# Patient Record
Sex: MALE | Race: OTHER
[De-identification: names, ages, dates, MRNs, and addresses within clinical notes are randomized per-mention and may not be internally consistent; named-entity substitution may affect disease eponyms.]

---

## 2017-12-04 ENCOUNTER — HOSPITAL ENCOUNTER (INPATIENT)
Dept: HOSPITAL 74 - YASAS | Age: 52
LOS: 4 days | Discharge: HOME | End: 2017-12-08
Attending: INTERNAL MEDICINE | Admitting: INTERNAL MEDICINE
Payer: COMMERCIAL

## 2017-12-04 VITALS — BODY MASS INDEX: 37.2 KG/M2

## 2017-12-04 DIAGNOSIS — G47.00: ICD-10-CM

## 2017-12-04 DIAGNOSIS — Z98.84: ICD-10-CM

## 2017-12-04 DIAGNOSIS — F10.24: ICD-10-CM

## 2017-12-04 DIAGNOSIS — F10.230: Primary | ICD-10-CM

## 2017-12-04 DIAGNOSIS — F34.1: ICD-10-CM

## 2017-12-04 LAB
APPEARANCE UR: CLEAR
BILIRUB UR STRIP.AUTO-MCNC: NEGATIVE MG/DL
COLOR UR: (no result)
KETONES UR QL STRIP: NEGATIVE
NITRITE UR QL STRIP: NEGATIVE
PH UR: 5 [PH] (ref 5–8)
PROT UR QL STRIP: (no result)
PROT UR QL STRIP: NEGATIVE
RBC # UR STRIP: NEGATIVE /UL
SP GR UR: 1 (ref 1–1.03)
UROBILINOGEN UR STRIP-MCNC: NEGATIVE MG/DL (ref 0.2–1)

## 2017-12-04 PROCEDURE — HZ2ZZZZ DETOXIFICATION SERVICES FOR SUBSTANCE ABUSE TREATMENT: ICD-10-PCS | Performed by: INTERNAL MEDICINE

## 2017-12-04 RX ADMIN — Medication SCH MG: at 22:07

## 2017-12-04 NOTE — HP
CIWA Score





- CIWA Score


Nausea/Vomitin-Mild Nausea/No Vomiting


Muscle Tremors: 4-Moderate,w/Arms Extend


Anxiety: 4-Mod. Anxious/Guarded


Agitation: 4-Moderately Restless


Paroxysmal Sweats: 1-Minimal Palms Moist


Orientation: 1-Uncertain about Date


Tacttile Disturbances: 0-None


Auditory Disturbances: 0-None


Visual Disturbances: 0-None


Headache: 0-None Present


CIWA-Ar Total Score: 15





Admission ROS [facility]





- HPI


Chief Complaint: 





WITHDRAWAL SX


Allergies/Adverse Reactions: 


 Allergies











Allergy/AdvReac Type Severity Reaction Status Date / Time


 


No Known Allergies Allergy   Verified 17 17:36











History of Present Illness: 





52 YEARS OLD MALE WITH LONG HISTORY OF ALCOHOL DEPENDENCE GASTRIC BY [facility]  

HAS DEPRESSION IS ADMITTED TO DETOX


Exam Limitations: No Limitations





- Ebola screening


Have you traveled outside of the country in the last 21 days: No


Have you had contact with anyone from an Ebola affected area: No


Have you been sick,other than usual withdrawal symptoms: No


Do you have a fever: No





- Review of Systems


Constitutional: Chills, Weight Stable


EENT: reports: No Symptoms Reported


Respiratory: reports: No Symptoms reported


Cardiac: reports: No Symptoms Reported


GI: reports: Nausea, Poor Fluid Intake, Abdominal cramping


: reports: No Symptoms Reported


Musculoskeletal: reports: No Symptoms Reported


Integumentary: reports: Bruising, Change in Color (FOREHEAD FELL  TREATED 

AT [facility] NEGATIVE CT HEAD)


Neuro: reports: Tremors


Endocrine: reports: No Symptoms Reported


Hematology: reports: No Symptoms Reported


Psychiatric: reports: Judgement Intact, Anxious, Depressed


Other Systems: Reviewed and Negative





Patient History





- Patient Medical History


Hx Anemia: No


Hx Asthma: No


Hx Chronic Obstructive Pulmonary Disease (COPD): No


Hx Cancer: No


Hx Cardiac Disorders: No


Hx Congestive Heart Failure: No


Hx Hypertension: No


Hx Hypercholesterolemia: No


Hx Pacemaker: No


HX Cerebrovascular Accident: No


Hx Seizures: No


Hx Dementia: No


Hx Diabetes: No


Hx Gastrointestinal Disorders: No


Hx Liver Disease: No


Hx Genitourinary Disorders: No


Hx Sexually Transmitted Disorders: No


Hx Renal Disease (ESRD): No


Hx Thyroid Disease: No


Hx Human Immunodeficiency Virus (HIV): No


Hx Hepatitis C: No


Hx Depression: Yes


Hx Suicide Attempt: No


Hx Bipolar Disorder: No


Hx Schizophrenia: No





- Patient Surgical History


Past Surgical History: Yes


Hx Abdominal Surgery: Yes ()


Anesthesia Reaction: No





- PPD History


Previous Implant?: Yes


Documented Results: Negative w/o proof


Implanted On Prior [facility] Admission?: No


PPD to be Administered?: Yes





- Smoking Cessation


Smoking history: Former smoker


Have you smoked in the past 12 months: No


Aproximately how many cigarettes per day: 0


Hx Chewing Tobacco Use: No


Initiated information on smoking cessation: No





- Substance & Tx. History


Hx Alcohol Use: Yes


Hx Substance Use: No


Substance Use Type: Alcohol


Hx Substance Use Treatment: No





- Substances Abused


  ** Alcohol


Route: Oral


Frequency: Daily


Amount used: 20 BEERS X40 OZ


Age of first use: 18


Date of Last Use: 17





Family Disease History





- Family Disease History


Family Disease History: Diabetes: Mother, [place]: Father (), Other: Father





Admission Physical Exam [facility]





- Vital Signs


Vital Signs: 


 Vital Signs - 24 hr











  17





  17:03


 


Temperature 98.0 F


 


Pulse Rate 102 H


 


Respiratory 18





Rate 


 


Blood Pressure 141/90














- Physical


General Appearance: Yes: Appropriately Dressed, Moderate Distress, Alcohol on 

Breath, Obese, Tremorous, Irritable, Sweating, Anxious


HEENTM: Yes: Hearing grossly Normal, Normal ENT Inspection, Normocephalic, 

Normal Voice


Respiratory: Yes: Chest Non-Tender, Lungs Clear, Normal Breath Sounds, No 

Respiratory Distress, No Accessory Muscle Use


Neck: Yes: Supple, Trachea in good position


Breast: Yes: Breasts Symetrical


Cardiology: Yes: Regular Rhythm, S1, S2, Tachycardia


Abdominal: Yes: Normal Bowel Sounds, Non Tender, Soft


Genitourinary: Yes: Within Normal Limits


Back: Yes: Normal Inspection


Musculoskeletal: Yes: full range of Motion, Gait Steady


Extremities: Yes: Normal Inspection, Normal Range of Motion, Non-Tender, Tremors


Neurological: Yes: Alert, Motor Strength 5/5, Normal Response, Depressed Affect


Integumentary: Yes: Warm


Lymphatic: Yes: Within Normal Limits





- Diagnostic


(1) Alcohol dependence with uncomplicated withdrawal


Current Visit: Yes   Status: Acute   





(2) Gastric bypass status for obesity


Current Visit: Yes   Status: Resolved   Comment:    





(3) Depression (emotion)


Current Visit: Yes   Status: Suspected   


Qualifiers: 


   Depression Type: dysthymia   Qualified Code(s): F34.1 - Dysthymic disorder   





Cleared for Admission [facility]





- Detox or Rehab


[facility] Level of Care: Medically Managed


Detox Regimen/Protocol: Librium





[facility] Breath Alcohol Content


Breath Alcohol Content: 0.249





Urine Drug Screen





- Results


Drug Screen Negative: Yes

## 2017-12-05 LAB
ALBUMIN SERPL-MCNC: 3.3 G/DL (ref 3.4–5)
ALP SERPL-CCNC: 98 U/L (ref 45–117)
ALT SERPL-CCNC: 37 U/L (ref 12–78)
ANION GAP SERPL CALC-SCNC: 10 MMOL/L (ref 8–16)
AST SERPL-CCNC: 39 U/L (ref 15–37)
BILIRUB SERPL-MCNC: 0.9 MG/DL (ref 0.2–1)
CALCIUM SERPL-MCNC: 7.8 MG/DL (ref 8.5–10.1)
CO2 SERPL-SCNC: 25 MMOL/L (ref 21–32)
CREAT SERPL-MCNC: 0.8 MG/DL (ref 0.7–1.3)
DEPRECATED RDW RBC AUTO: 15 % (ref 11.9–15.9)
GLUCOSE SERPL-MCNC: 179 MG/DL (ref 74–106)
MCH RBC QN AUTO: 25 PG (ref 25.7–33.7)
MCHC RBC AUTO-ENTMCNC: 31.8 G/DL (ref 32–35.9)
MCV RBC: 78.6 FL (ref 80–96)
PLATELET # BLD AUTO: 207 K/MM3 (ref 134–434)
PMV BLD: 9.1 FL (ref 7.5–11.1)
PROT SERPL-MCNC: 6.9 G/DL (ref 6.4–8.2)
WBC # BLD AUTO: 6.1 K/MM3 (ref 4–10)

## 2017-12-05 RX ADMIN — ZOLPIDEM TARTRATE PRN MG: 10 TABLET, FILM COATED ORAL at 22:01

## 2017-12-05 RX ADMIN — BACITRACIN ZINC SCH GM: 500 OINTMENT TOPICAL at 22:00

## 2017-12-05 RX ADMIN — CYCLOBENZAPRINE HYDROCHLORIDE PRN MG: 10 TABLET, FILM COATED ORAL at 12:36

## 2017-12-05 RX ADMIN — Medication SCH TAB: at 10:02

## 2017-12-05 RX ADMIN — BACITRACIN ZINC SCH GM: 500 OINTMENT TOPICAL at 12:33

## 2017-12-05 RX ADMIN — Medication SCH MG: at 22:00

## 2017-12-05 NOTE — CONSULT
[facility] Psychiatric Consult





- Data


Date of interview: 12/05/17


Admission source: [facility]


Identifying data: First admission to [facility] for this 53 y/o  male 

seeking detox treatment on [address] for alcohol dependence.Patient is single,a 

father of one,domiciled and employed.


Substance Abuse History: Patient admits to active use of alcohol daily as 

detailed in the current [facility] report.See details.  Smoking history: Former 

smoker.  Have you smoked in the past 12 months: No.  Aproximately how many 

cigarettes per day: 0.  Hx Chewing Tobacco Use: No.  Initiated information on 

smoking cessation: No.  - Substance & Tx. History.  Hx Alcohol Use: Yes.  Hx 

Substance Use: No.  Substance Use Type: Alcohol.  Hx Substance Use Treatment: 

No.  - Substances Abused.  ** Alcohol.  Route: Oral.  Frequency: Daily.  Amount 

used: 20 BEERS X40 OZ.  Age of first use: 18.  Date of Last Use: 12/04/17


Medical History: Diabetes mellitus and a history of gastric bypass.


Psychiatric History: Patient denies.


Physical/Sexual Abuse/Trauma History: Patient denies.


Additional Comment: Drug Screen is negative.





Mental Status Exam





- Mental Status Exam


Alert and Oriented to: Time, Place, Person


Cognitive Function: Good


Patient Appearance: Well Groomed


Mood: Hopeful, Euthymic


Affect: Appropriate, Normal Range


Patient Behavior: Fatigued, Cooperative


Speech Pattern: Clear


Voice Loudness: Normal


Thought Process: Intact, Goal Oriented


Thought Disorder: Not Present


Hallucinations: Denies


Suicidal Ideation: Denies


Homicidal Ideation: Denies


Insight/Judgement: Poor


Sleep: Poorly, Difficulty falling asleep


Appetite: Good


Muscle strength/Tone: Normal


Gait/Station: Normal





Psychiatric Findings





- Problem List (Axis 1, 2,3)


(1) Alcohol dependence with uncomplicated withdrawal


Current Visit: Yes   Status: Acute   





(2) Alcohol-induced mood disorder


Current Visit: Yes   Status: Suspected   





(3) Insomnia


Current Visit: Yes   Status: Acute   





- Initial Treatment Plan


Initial Treatment Plan: Psychoeducation.Sleep hygiene.Detoxification in 

progress.Ambien 10 mg po hs prn.Side effects/benefits discussed with the 

patient.Observation.

## 2017-12-05 NOTE — PN
[facility] CIWA





- CIWA Score


Nausea/Vomitin-No Nausea/No Vomiting


Muscle Tremors: 4-Moderate,w/Arms Extend


Anxiety: 5


Agitation: 4-Moderately Restless


Paroxysmal Sweats: 3


Orientation: 0-Oriented


Tacttile Disturbances: 3-Moderate Itch/Numb/Burn


Auditory Disturbances: 0-None


Visual Disturbances: 0-None


Headache: 0-None Present


CIWA-Ar Total Score: 19





[facility] Progress Note (SOAP)


Subjective: 





Tremors, Sweating, Body aches, Anxious, Interrupted Sleep.


Objective: 


PT. A & O X 3, OBSERVED AMBULATING ON UNIT. NO ACUTE DISTRESS.





17 13:05


 Vital Signs











Temperature  96.6 F L  17 09:42


 


Pulse Rate  101 H  17 09:42


 


Respiratory Rate  20   17 09:42


 


Blood Pressure  145/84   17 09:42


 


O2 Sat by Pulse Oximetry (%)      








 Laboratory Tests











  17





  23:00 07:00 07:00


 


WBC   6.1 


 


RBC   5.39 


 


Hgb   13.5 


 


Hct   42.4 


 


MCV   78.6 L 


 


MCH   25.0 L 


 


MCHC   31.8 L 


 


RDW   15.0 


 


Plt Count   207 


 


MPV   9.1 


 


Sodium    139


 


Potassium    4.4


 


Chloride    104


 


Carbon Dioxide    25


 


Anion Gap    10


 


BUN    13


 


Creatinine    0.8


 


Creat Clearance w eGFR    > 60


 


Random Glucose    179 H


 


Calcium    7.8 L


 


Total Bilirubin    0.9


 


AST    39 H


 


ALT    37


 


Alkaline Phosphatase    98


 


Total Protein    6.9


 


Albumin    3.3 L


 


Urine Color  Straw  


 


Urine Appearance  Clear  


 


Urine pH  5.0  


 


Ur Specific Gravity  1.005  


 


Urine Protein  Negative  


 


Urine Glucose (UA)  3+ H  


 


Urine Ketones  Negative  


 


Urine Blood  Negative  


 


Urine Nitrite  Negative  


 


Urine Bilirubin  Negative  


 


Urine Urobilinogen  Negative  


 


Ur Leukocyte Esterase  Negative  


 


RPR Titer   














  17





  07:00


 


WBC 


 


RBC 


 


Hgb 


 


Hct 


 


MCV 


 


MCH 


 


MCHC 


 


RDW 


 


Plt Count 


 


MPV 


 


Sodium 


 


Potassium 


 


Chloride 


 


Carbon Dioxide 


 


Anion Gap 


 


BUN 


 


Creatinine 


 


Creat Clearance w eGFR 


 


Random Glucose 


 


Calcium 


 


Total Bilirubin 


 


AST 


 


ALT 


 


Alkaline Phosphatase 


 


Total Protein 


 


Albumin 


 


Urine Color 


 


Urine Appearance 


 


Urine pH 


 


Ur Specific Gravity 


 


Urine Protein 


 


Urine Glucose (UA) 


 


Urine Ketones 


 


Urine Blood 


 


Urine Nitrite 


 


Urine Bilirubin 


 


Urine Urobilinogen 


 


Ur Leukocyte Esterase 


 


RPR Titer  Nonreactive








LABS NOTED.


Assessment: 





17 13:07


WITHDRAWAL SYMPTOMS.


Plan: 





CONTINUE DETOX.


CLONIDINE, 01. MG PO X 1 FOR DETOX SYMPTOMS.


FLEXERIL PRN FOR BODY ACHES/ MUSCLE SPASMS.


INCREASE DAILY PO FLUID INTAKE.

## 2017-12-06 LAB
APPEARANCE UR: CLEAR
BILIRUB UR STRIP.AUTO-MCNC: NEGATIVE MG/DL
COLOR UR: (no result)
KETONES UR QL STRIP: NEGATIVE
LEUKOCYTE ESTERASE UR QL STRIP.AUTO: NEGATIVE
NITRITE UR QL STRIP: NEGATIVE
PH UR: 6 [PH] (ref 5–8)
PROT UR QL STRIP: (no result)
PROT UR QL STRIP: NEGATIVE
RBC # UR STRIP: NEGATIVE /UL
SP GR UR: 1.01 (ref 1–1.03)
UROBILINOGEN UR STRIP-MCNC: NEGATIVE MG/DL (ref 0.2–1)

## 2017-12-06 RX ADMIN — BACITRACIN ZINC SCH GM: 500 OINTMENT TOPICAL at 10:07

## 2017-12-06 RX ADMIN — Medication SCH MG: at 22:05

## 2017-12-06 RX ADMIN — CYCLOBENZAPRINE HYDROCHLORIDE PRN MG: 10 TABLET, FILM COATED ORAL at 19:45

## 2017-12-06 RX ADMIN — Medication SCH TAB: at 10:07

## 2017-12-06 RX ADMIN — BACITRACIN ZINC SCH GM: 500 OINTMENT TOPICAL at 22:05

## 2017-12-06 RX ADMIN — ZOLPIDEM TARTRATE PRN MG: 10 TABLET, FILM COATED ORAL at 22:06

## 2017-12-06 RX ADMIN — CYCLOBENZAPRINE HYDROCHLORIDE PRN MG: 10 TABLET, FILM COATED ORAL at 07:15

## 2017-12-06 NOTE — EKG
Test Reason : 

Blood Pressure : ***/*** mmHG

Vent. Rate : 098 BPM     Atrial Rate : 098 BPM

   P-R Int : 178 ms          QRS Dur : 100 ms

    QT Int : 378 ms       P-R-T Axes : 040 010 059 degrees

   QTc Int : 482 ms

 

NORMAL SINUS RHYTHM

POSSIBLE INFERIOR INFARCT (CITED ON OR BEFORE 04-DEC-2017)

ABNORMAL ECG

WHEN COMPARED WITH ECG OF 04-DEC-2017 21:43,

NO SIGNIFICANT CHANGE WAS FOUND

Confirmed by MD Kelsey Daniel ([id number]) on 12/5/2017 4:47:30 PM

Also confirmed by MD Kelesy Daniel ([id number]),  GREYSON RODRIGEZ

([id number])  on 12/6/2017 8:27:13 AM

 

Referred By: Juan C EDWARDS           Confirmed By:Greyson Kelsey MD

## 2017-12-06 NOTE — EKG
Test Reason : 

Blood Pressure : ***/*** mmHG

Vent. Rate : 097 BPM     Atrial Rate : 097 BPM

   P-R Int : 198 ms          QRS Dur : 108 ms

    QT Int : 376 ms       P-R-T Axes : 041 014 050 degrees

   QTc Int : 477 ms

 

NORMAL SINUS RHYTHM

INFERIOR INFARCT , AGE UNDETERMINED

ABNORMAL ECG

NO PREVIOUS ECGS AVAILABLE

Confirmed by MD Kelsey Daniel ([id number]) on 12/5/2017 3:03:17 PM

Also confirmed by MD Kelsey Daniel [id number]),  GREYSON RODRIGEZ

([id number])  on 12/6/2017 7:59:06 AM

 

Referred By: Juan C EDWARDS           Confirmed By:Greyson Kelsey MD

## 2017-12-06 NOTE — PN
[facility] CIWA





- CIWA Score


Nausea/Vomitin-No Nausea/No Vomiting


Muscle Tremors: 4-Moderate,w/Arms Extend


Anxiety: 5


Agitation: 4-Moderately Restless


Paroxysmal Sweats: No Perspiration


Orientation: 2-Disoriented Date<2 days


Tacttile Disturbances: 2-Mild Itch/Numbness/Burn


Auditory Disturbances: 0-None


Visual Disturbances: 0-None


Headache: 0-None Present


CIWA-Ar Total Score: 17





[facility] Progress Note (SOAP)


Subjective: 





Tremors, Stomach Cramping, Body Aches, Anxious, Interrupted Sleep.


Objective: 


PT. A & O X 2 (UNCERTAIN ABOUT DAY/ DATE). PT. OBSERVED AMBULATING ON UNIT. NO 

ACUTE DISTRESS.





17 11:54


 Vital Signs











Temperature  96.7 F L  17 08:55


 


Pulse Rate  91 H  17 08:55


 


Respiratory Rate  20   17 08:55


 


Blood Pressure  152/78   17 08:55


 


O2 Sat by Pulse Oximetry (%)      








 Laboratory Tests











  17





  23:00 07:00 07:00


 


WBC   6.1 


 


RBC   5.39 


 


Hgb   13.5 


 


Hct   42.4 


 


MCV   78.6 L 


 


MCH   25.0 L 


 


MCHC   31.8 L 


 


RDW   15.0 


 


Plt Count   207 


 


MPV   9.1 


 


Sodium    139


 


Potassium    4.4


 


Chloride    104


 


Carbon Dioxide    25


 


Anion Gap    10


 


BUN    13


 


Creatinine    0.8


 


Creat Clearance w eGFR    > 60


 


Random Glucose    179 H


 


Calcium    7.8 L


 


Total Bilirubin    0.9


 


AST    39 H


 


ALT    37


 


Alkaline Phosphatase    98


 


Total Protein    6.9


 


Albumin    3.3 L


 


Urine Color  Straw  


 


Urine Appearance  Clear  


 


Urine pH  5.0  


 


Ur Specific Gravity  1.005  


 


Urine Protein  Negative  


 


Urine Glucose (UA)  3+ H  


 


Urine Ketones  Negative  


 


Urine Blood  Negative  


 


Urine Nitrite  Negative  


 


Urine Bilirubin  Negative  


 


Urine Urobilinogen  Negative  


 


Ur Leukocyte Esterase  Negative  


 


RPR Titer   














  17





  07:00


 


WBC 


 


RBC 


 


Hgb 


 


Hct 


 


MCV 


 


MCH 


 


MCHC 


 


RDW 


 


Plt Count 


 


MPV 


 


Sodium 


 


Potassium 


 


Chloride 


 


Carbon Dioxide 


 


Anion Gap 


 


BUN 


 


Creatinine 


 


Creat Clearance w eGFR 


 


Random Glucose 


 


Calcium 


 


Total Bilirubin 


 


AST 


 


ALT 


 


Alkaline Phosphatase 


 


Total Protein 


 


Albumin 


 


Urine Color 


 


Urine Appearance 


 


Urine pH 


 


Ur Specific Gravity 


 


Urine Protein 


 


Urine Glucose (UA) 


 


Urine Ketones 


 


Urine Blood 


 


Urine Nitrite 


 


Urine Bilirubin 


 


Urine Urobilinogen 


 


Ur Leukocyte Esterase 


 


RPR Titer  Nonreactive








LABS NOTED.


Assessment: 





17 11:54


WITHDRAWAL SYMPTOMS.


Plan: 





CONTINUE DETOX.


BGM ACBK, HGB A1C AND REPEAT UA FOR ELEVATED ADMISSION RANDOM GLUCOSE AND URINE 

GLUCOSE LEVELS.

## 2017-12-07 VITALS — TEMPERATURE: 97 F

## 2017-12-07 RX ADMIN — ZOLPIDEM TARTRATE PRN MG: 10 TABLET, FILM COATED ORAL at 22:06

## 2017-12-07 RX ADMIN — Medication SCH MG: at 22:06

## 2017-12-07 RX ADMIN — CYCLOBENZAPRINE HYDROCHLORIDE PRN MG: 10 TABLET, FILM COATED ORAL at 19:32

## 2017-12-07 RX ADMIN — CYCLOBENZAPRINE HYDROCHLORIDE PRN MG: 10 TABLET, FILM COATED ORAL at 05:11

## 2017-12-07 RX ADMIN — BACITRACIN ZINC SCH GM: 500 OINTMENT TOPICAL at 22:06

## 2017-12-07 RX ADMIN — BACITRACIN ZINC SCH GM: 500 OINTMENT TOPICAL at 10:07

## 2017-12-07 RX ADMIN — Medication SCH TAB: at 10:07

## 2017-12-07 NOTE — PN
[facility] Progress Note (SOAP)


Subjective: 





Fatigue, Anxious.


Objective: 


PT. A & O  X 3, OBSERVED AMBULATING ON UNIT. NO ACUTE DISTRESS.





12/07/17 15:03


 Vital Signs











Temperature  97.1 F L  12/07/17 12:58


 


Pulse Rate  95 H  12/07/17 12:58


 


Respiratory Rate  20   12/07/17 12:58


 


Blood Pressure  138/78   12/07/17 12:58


 


O2 Sat by Pulse Oximetry (%)      








 Laboratory Tests











  12/04/17 12/05/17 12/05/17





  23:00 07:00 07:00


 


WBC   6.1 


 


RBC   5.39 


 


Hgb   13.5 


 


Hct   42.4 


 


MCV   78.6 L 


 


MCH   25.0 L 


 


MCHC   31.8 L 


 


RDW   15.0 


 


Plt Count   207 


 


MPV   9.1 


 


Sodium    139


 


Potassium    4.4


 


Chloride    104


 


Carbon Dioxide    25


 


Anion Gap    10


 


BUN    13


 


Creatinine    0.8


 


Creat Clearance w eGFR    > 60


 


POC Glucometer   


 


Random Glucose    179 H


 


Hemoglobin A1c %   


 


Calcium    7.8 L


 


Total Bilirubin    0.9


 


AST    39 H


 


ALT    37


 


Alkaline Phosphatase    98


 


Total Protein    6.9


 


Albumin    3.3 L


 


Urine Color  Straw  


 


Urine Appearance  Clear  


 


Urine pH  5.0  


 


Ur Specific Gravity  1.005  


 


Urine Protein  Negative  


 


Urine Glucose (UA)  3+ H  


 


Urine Ketones  Negative  


 


Urine Blood  Negative  


 


Urine Nitrite  Negative  


 


Urine Bilirubin  Negative  


 


Urine Urobilinogen  Negative  


 


Ur Leukocyte Esterase  Negative  


 


RPR Titer   














  12/05/17 12/06/17 12/07/17





  07:00 21:45 05:12


 


WBC   


 


RBC   


 


Hgb   


 


Hct   


 


MCV   


 


MCH   


 


MCHC   


 


RDW   


 


Plt Count   


 


MPV   


 


Sodium   


 


Potassium   


 


Chloride   


 


Carbon Dioxide   


 


Anion Gap   


 


BUN   


 


Creatinine   


 


Creat Clearance w eGFR   


 


POC Glucometer    166


 


Random Glucose   


 


Hemoglobin A1c %   


 


Calcium   


 


Total Bilirubin   


 


AST   


 


ALT   


 


Alkaline Phosphatase   


 


Total Protein   


 


Albumin   


 


Urine Color   Straw 


 


Urine Appearance   Clear 


 


Urine pH   6.0 


 


Ur Specific Gravity   1.014 


 


Urine Protein   Negative 


 


Urine Glucose (UA)   3+ H 


 


Urine Ketones   Negative 


 


Urine Blood   Negative 


 


Urine Nitrite   Negative 


 


Urine Bilirubin   Negative 


 


Urine Urobilinogen   Negative 


 


Ur Leukocyte Esterase   Negative 


 


RPR Titer  Nonreactive  














  12/07/17





  07:00


 


WBC 


 


RBC 


 


Hgb 


 


Hct 


 


MCV 


 


MCH 


 


MCHC 


 


RDW 


 


Plt Count 


 


MPV 


 


Sodium 


 


Potassium 


 


Chloride 


 


Carbon Dioxide 


 


Anion Gap 


 


BUN 


 


Creatinine 


 


Creat Clearance w eGFR 


 


POC Glucometer 


 


Random Glucose 


 


Hemoglobin A1c %  8.4 H


 


Calcium 


 


Total Bilirubin 


 


AST 


 


ALT 


 


Alkaline Phosphatase 


 


Total Protein 


 


Albumin 


 


Urine Color 


 


Urine Appearance 


 


Urine pH 


 


Ur Specific Gravity 


 


Urine Protein 


 


Urine Glucose (UA) 


 


Urine Ketones 


 


Urine Blood 


 


Urine Nitrite 


 


Urine Bilirubin 


 


Urine Urobilinogen 


 


Ur Leukocyte Esterase 


 


RPR Titer 








LABS NOTED.


RESULTS OF BGM ACBK, HGB A1C, AND REPEAT UA NOTED.


12/07/17 15:04





Assessment: 





12/07/17 15:03


WITHDRAWAL SYMPTOMS.


Plan: 





CONTINUE DETOX.


INCREASE DAILY PO FLUID INTAKE.

## 2017-12-08 VITALS — HEART RATE: 83 BPM | SYSTOLIC BLOOD PRESSURE: 145 MMHG | DIASTOLIC BLOOD PRESSURE: 83 MMHG

## 2017-12-08 RX ADMIN — CYCLOBENZAPRINE HYDROCHLORIDE PRN MG: 10 TABLET, FILM COATED ORAL at 05:36

## 2017-12-08 NOTE — DS
[facility] Detox Discharge Summary


Admission Date: 


12/04/17





Discharge Date: 12/08/17





- History


Present History: Alcohol Dependence


Additional Comments: 





PATIENT ELECTING TO GO HOME AND DECLINES REFERRAL FOR AFTERCARE. PATIENT 

ADVISED TO CONSIDER LOCAL 12-STEP / AA OUTPATIENT SUPPORT GROUP MEETINGS FOR 

AFTERCARE. PATIENT WAS DISCHARGED FROM DETOX UNIT IN STABLE MEDICAL CONDITION.


Pertinent Past History: 





Insomnia, Depression, History of Gastric Bypass Surgery.





- Physical Exam Results


Vital Signs: 


 Vital Signs











Temperature  97 F L  12/08/17 06:17


 


Pulse Rate  83   12/08/17 06:17


 


Respiratory Rate  20   12/08/17 06:17


 


Blood Pressure  145/83   12/08/17 06:17


 


O2 Sat by Pulse Oximetry (%)      











Pertinent Admission Physical Exam Findings: 





WITHDRAWAL SYMPTOMS.





 Laboratory Tests











  12/04/17 12/05/17 12/05/17





  23:00 07:00 07:00


 


WBC   6.1 


 


RBC   5.39 


 


Hgb   13.5 


 


Hct   42.4 


 


MCV   78.6 L 


 


MCH   25.0 L 


 


MCHC   31.8 L 


 


RDW   15.0 


 


Plt Count   207 


 


MPV   9.1 


 


Sodium    139


 


Potassium    4.4


 


Chloride    104


 


Carbon Dioxide    25


 


Anion Gap    10


 


BUN    13


 


Creatinine    0.8


 


Creat Clearance w eGFR    > 60


 


POC Glucometer   


 


Random Glucose    179 H


 


Hemoglobin A1c %   


 


Calcium    7.8 L


 


Total Bilirubin    0.9


 


AST    39 H


 


ALT    37


 


Alkaline Phosphatase    98


 


Total Protein    6.9


 


Albumin    3.3 L


 


Urine Color  Straw  


 


Urine Appearance  Clear  


 


Urine pH  5.0  


 


Ur Specific Gravity  1.005  


 


Urine Protein  Negative  


 


Urine Glucose (UA)  3+ H  


 


Urine Ketones  Negative  


 


Urine Blood  Negative  


 


Urine Nitrite  Negative  


 


Urine Bilirubin  Negative  


 


Urine Urobilinogen  Negative  


 


Ur Leukocyte Esterase  Negative  


 


RPR Titer   














  12/05/17 12/06/17 12/07/17





  07:00 21:45 05:12


 


WBC   


 


RBC   


 


Hgb   


 


Hct   


 


MCV   


 


MCH   


 


MCHC   


 


RDW   


 


Plt Count   


 


MPV   


 


Sodium   


 


Potassium   


 


Chloride   


 


Carbon Dioxide   


 


Anion Gap   


 


BUN   


 


Creatinine   


 


Creat Clearance w eGFR   


 


POC Glucometer    166


 


Random Glucose   


 


Hemoglobin A1c %   


 


Calcium   


 


Total Bilirubin   


 


AST   


 


ALT   


 


Alkaline Phosphatase   


 


Total Protein   


 


Albumin   


 


Urine Color   Straw 


 


Urine Appearance   Clear 


 


Urine pH   6.0 


 


Ur Specific Gravity   1.014 


 


Urine Protein   Negative 


 


Urine Glucose (UA)   3+ H 


 


Urine Ketones   Negative 


 


Urine Blood   Negative 


 


Urine Nitrite   Negative 


 


Urine Bilirubin   Negative 


 


Urine Urobilinogen   Negative 


 


Ur Leukocyte Esterase   Negative 


 


RPR Titer  Nonreactive  














  12/07/17





  07:00


 


WBC 


 


RBC 


 


Hgb 


 


Hct 


 


MCV 


 


MCH 


 


MCHC 


 


RDW 


 


Plt Count 


 


MPV 


 


Sodium 


 


Potassium 


 


Chloride 


 


Carbon Dioxide 


 


Anion Gap 


 


BUN 


 


Creatinine 


 


Creat Clearance w eGFR 


 


POC Glucometer 


 


Random Glucose 


 


Hemoglobin A1c %  8.4 H


 


Calcium 


 


Total Bilirubin 


 


AST 


 


ALT 


 


Alkaline Phosphatase 


 


Total Protein 


 


Albumin 


 


Urine Color 


 


Urine Appearance 


 


Urine pH 


 


Ur Specific Gravity 


 


Urine Protein 


 


Urine Glucose (UA) 


 


Urine Ketones 


 


Urine Blood 


 


Urine Nitrite 


 


Urine Bilirubin 


 


Urine Urobilinogen 


 


Ur Leukocyte Esterase 


 


RPR Titer 








LABS NOTED.





- Treatment


Hospital Course: Detox Protocol Followed, Detoxed Safely, Responded well, 

Discharged Condition Good


Patient has Accepted a Rehab Referral to: PT. DECLINES. PT ADVISED TO CONSIDER 

LOCAL 12-STEP/AA SUPPORT GROUPS.





- Medication


Discharge Medications: 


Ambulatory Orders





NK [No Known Home Medication]  12/04/17 











- Diagnosis


(1) Alcohol dependence with uncomplicated withdrawal


Status: Acute   





(2) Insomnia


Status: Acute   


Qualifiers: 


   Insomnia type: unspecified   Qualified Code(s): G47.00 - Insomnia, 

unspecified   





(3) Alcohol-induced mood disorder


Status: Suspected   





(4) Gastric bypass status for obesity


Status: Resolved   





- AMA


Did Patient Leave Against Medical Advice: No

## 2018-06-26 PROBLEM — Z00.00 ENCOUNTER FOR PREVENTIVE HEALTH EXAMINATION: Status: ACTIVE | Noted: 2018-06-26

## 2018-07-12 ENCOUNTER — APPOINTMENT (OUTPATIENT)
Dept: PLASTIC SURGERY | Facility: CLINIC | Age: 53
End: 2018-07-12
Payer: MEDICAID

## 2018-07-12 PROCEDURE — 99024 POSTOP FOLLOW-UP VISIT: CPT

## 2018-10-11 ENCOUNTER — APPOINTMENT (OUTPATIENT)
Dept: PLASTIC SURGERY | Facility: CLINIC | Age: 53
End: 2018-10-11
Payer: MEDICAID

## 2018-10-11 VITALS — HEIGHT: 71 IN | BODY MASS INDEX: 30.1 KG/M2 | WEIGHT: 215 LBS

## 2018-10-11 PROCEDURE — 99213 OFFICE O/P EST LOW 20 MIN: CPT

## 2019-01-09 ENCOUNTER — APPOINTMENT (OUTPATIENT)
Dept: PLASTIC SURGERY | Facility: CLINIC | Age: 54
End: 2019-01-09
Payer: MEDICAID

## 2019-01-09 VITALS — WEIGHT: 215 LBS | BODY MASS INDEX: 30.1 KG/M2 | HEIGHT: 71 IN

## 2019-01-09 PROCEDURE — 99213 OFFICE O/P EST LOW 20 MIN: CPT

## 2019-01-10 ENCOUNTER — APPOINTMENT (OUTPATIENT)
Dept: PLASTIC SURGERY | Facility: CLINIC | Age: 54
End: 2019-01-10

## 2019-02-01 NOTE — HISTORY OF PRESENT ILLNESS
[FreeTextEntry1] : Patietn known to me from previous consultation. Now achieved goal weight which has been stable for 6 months. \par Good candidate for panniculectomy.\par Will coordinate surgery for near future.

## 2019-02-05 ENCOUNTER — HOSPITAL ENCOUNTER (EMERGENCY)
Dept: HOSPITAL 74 - JER | Age: 54
Discharge: LEFT BEFORE DISCHARGE | End: 2019-02-05
Payer: COMMERCIAL

## 2019-02-05 VITALS — BODY MASS INDEX: 39.5 KG/M2

## 2019-02-05 VITALS — HEART RATE: 96 BPM | DIASTOLIC BLOOD PRESSURE: 47 MMHG | TEMPERATURE: 98.3 F | SYSTOLIC BLOOD PRESSURE: 106 MMHG

## 2019-02-05 DIAGNOSIS — F10.120: Primary | ICD-10-CM

## 2019-02-05 NOTE — PDOC
History of Present Illness





- General


Chief Complaint: Alcohol intoxication


Stated Complaint: Alcohol intoxication


Time Seen by Provider: 02/05/19 14:35


History Source: Patient


Exam Limitations: No Limitations





- History of Present Illness


Initial Comments: 





02/05/19 14:55


Patient eloped from the department. Prior to his elopement, the patient had a 

steady gait, ambulating on his own volition, produced logical and coherent 

speech without deficits, and understood his disease process and the reasonings 

for his emergency department evaluation. 





Past History





- Past Medical History


Allergies/Adverse Reactions: 


 Allergies











Allergy/AdvReac Type Severity Reaction Status Date / Time


 


No Known Allergies Allergy   Verified 12/04/17 17:36











Home Medications: 


Ambulatory Orders





NK [No Known Home Medication]  12/04/17 








Anemia: No


Asthma: No


Cancer: No


Cardiac Disorders: No


CVA: No


COPD: No


CHF: No


Dementia: No


Diabetes: No


GI Disorders: No


 Disorders: No


HTN: No


Hypercholesterolemia: No


Kidney Stones: No


Liver Disease: No


Seizures: No


Thyroid Disease: No





- Surgical History


Abdominal Surgery: Yes (1997)





- Reproductive History


Testicular Surgery: No





- Suicide/Smoking/Psychosocial Hx


Smoking History: Former smoker


Have you smoked in the past 12 months: No


Number of Cigarettes Smoked Daily: 0


Hx Alcohol Use: Yes


Drug/Substance Use Hx: No


Substance Use Type: Alcohol


Hx Substance Use Treatment: No





**Review of Systems





- Review of Systems


Able to Perform ROS?: No (patient eloped)





*Physical Exam





- Physical Exam


General Appearance: Yes: Nourished, Appropriately Dressed, Other (Unable to do 

a full exam given the patient eloped from the department).  No: Apparent 

Distress, Disheveled





Medical Decision Making





- Medical Decision Making





02/06/19 14:44


Patient eloped from the department








*DC/Admit/Observation/Transfer


Diagnosis at time of Disposition: 


 Eloped from emergency department








- Referrals





- Patient Instructions





- Post Discharge Activity

## 2019-02-05 NOTE — PDOC
Attending Attestation





- Resident


Resident Name: Joey Tierney





- ED Attending Attestation


I have performed the following: I have examined & evaluated the patient, The 

case was reviewed & discussed with the resident, I agree w/resident's findings 

& plan, Exceptions are as noted





- HPI


HPI: 





02/05/19 14:55


53 M with h/o ETOH abuse presents to ED for alcohol intoxication, seeking 

detox. At time of my evaluation, pt is ambulatory in ED with steady gait. No 

slurred speech, awake and alert. Pt states that he is interested in going to 

detox at [facility]. Denies any complaints at this time.





- Physicial Exam


PE: 





02/05/19 14:56


GENERAL: Awake, alert, and fully oriented, in no acute distress.


HEAD: No signs of trauma


EYES: PERRLA, EOMI, sclera anicteric, conjunctiva clear


ENT: Auricles normal inspection, hearing grossly normal, nares patent, 

oropharynx clear without exudates. Moist mucosa


NECK: Nontender, no stepoffs, Normal ROM, supple, no lymphadenopathy, JVD, or 

masses


LUNGS: Breath sounds equal, clear to auscultation bilaterally.  No wheezes, and 

no crackles


HEART: Regular rate and rhythm, normal S1 and S2, no murmurs, rubs or gallops


ABDOMEN: Soft, nontender, normoactive bowel sounds.  No guarding, no rebound.  

No masses


EXTREMITIES: Normal range of motion, no edema.  No clubbing or cyanosis. No 

cords, erythema, or tenderness


NEUROLOGICAL: Cranial nerves II through XII intact. 5/5 strength and sensation 

in all extremities, Normal speech, normal gait, normal cerebellar function


SKIN: Warm, Dry, normal turgor, no rashes or lesions noted.





- Medical Decision Making





02/05/19 14:56


53 M brought in for ETOH intoxication, now clinically sober and requesting 

detox.





Pt witnessed by other patient in ED leaving department.





At time of initial ED evaluation, pt was clinically sober, ambulatory with 

steady gait, denying SI/HI/AVH. I believe pt was competent, of sound mind, and 

had capacity to make his own medical decisions.

## 2019-03-04 ENCOUNTER — APPOINTMENT (OUTPATIENT)
Dept: PLASTIC SURGERY | Facility: HOSPITAL | Age: 54
End: 2019-03-04

## 2023-02-01 ENCOUNTER — HOSPITAL ENCOUNTER (EMERGENCY)
Dept: HOSPITAL 74 - JER | Age: 58
Discharge: LEFT BEFORE DISCHARGE | End: 2023-02-01
Payer: COMMERCIAL

## 2023-02-01 VITALS
DIASTOLIC BLOOD PRESSURE: 64 MMHG | SYSTOLIC BLOOD PRESSURE: 110 MMHG | RESPIRATION RATE: 18 BRPM | TEMPERATURE: 97 F | HEART RATE: 111 BPM

## 2023-02-01 VITALS — BODY MASS INDEX: 25 KG/M2

## 2023-02-01 DIAGNOSIS — M54.32: Primary | ICD-10-CM
